# Patient Record
Sex: MALE | Race: WHITE | NOT HISPANIC OR LATINO | URBAN - METROPOLITAN AREA
[De-identification: names, ages, dates, MRNs, and addresses within clinical notes are randomized per-mention and may not be internally consistent; named-entity substitution may affect disease eponyms.]

---

## 2021-06-07 ENCOUNTER — OFFICE VISIT (OUTPATIENT)
Dept: URBAN - METROPOLITAN AREA CLINIC 44 | Facility: CLINIC | Age: 76
End: 2021-06-07

## 2021-08-28 ENCOUNTER — TELEPHONE ENCOUNTER (OUTPATIENT)
Dept: URBAN - METROPOLITAN AREA CLINIC 13 | Facility: CLINIC | Age: 76
End: 2021-08-28

## 2021-08-29 ENCOUNTER — TELEPHONE ENCOUNTER (OUTPATIENT)
Dept: URBAN - METROPOLITAN AREA CLINIC 13 | Facility: CLINIC | Age: 76
End: 2021-08-29

## 2025-01-14 ENCOUNTER — DASHBOARD ENCOUNTERS (OUTPATIENT)
Age: 80
End: 2025-01-14

## 2025-01-15 ENCOUNTER — OFFICE VISIT (OUTPATIENT)
Dept: URBAN - METROPOLITAN AREA CLINIC 48 | Facility: CLINIC | Age: 80
End: 2025-01-15
Payer: MEDICARE

## 2025-01-15 VITALS
BODY MASS INDEX: 22.46 KG/M2 | TEMPERATURE: 97.9 F | DIASTOLIC BLOOD PRESSURE: 90 MMHG | OXYGEN SATURATION: 95 % | SYSTOLIC BLOOD PRESSURE: 149 MMHG | HEART RATE: 77 BPM | WEIGHT: 148.2 LBS | HEIGHT: 68 IN

## 2025-01-15 DIAGNOSIS — R10.13 DYSPEPSIA: ICD-10-CM

## 2025-01-15 DIAGNOSIS — K92.1 MELENA: ICD-10-CM

## 2025-01-15 DIAGNOSIS — Z86.19 HISTORY OF HELICOBACTER PYLORI INFECTION: ICD-10-CM

## 2025-01-15 PROCEDURE — 99204 OFFICE O/P NEW MOD 45 MIN: CPT | Performed by: PHYSICIAN ASSISTANT

## 2025-01-15 RX ORDER — MEMANTINE HYDROCHLORIDE 10 MG/1
1 TABLET TABLET ORAL TWICE A DAY
Refills: 0 | Status: ACTIVE | COMMUNITY

## 2025-01-15 RX ORDER — LISINOPRIL 10 MG/1
1 TABLET TABLET ORAL ONCE A DAY
Refills: 3 | Status: ACTIVE | COMMUNITY

## 2025-01-15 RX ORDER — FAMOTIDINE 40 MG/1
1 TABLET AT BEDTIME TABLET ORAL ONCE A DAY
OUTPATIENT

## 2025-01-15 RX ORDER — PANTOPRAZOLE SODIUM 40 MG/1
1 TABLET TABLET, DELAYED RELEASE ORAL ONCE A DAY
Refills: 0 | Status: ON HOLD | COMMUNITY

## 2025-01-15 RX ORDER — FAMOTIDINE 40 MG/1
1 TABLET AT BEDTIME TABLET ORAL ONCE A DAY
Refills: 1 | Status: ACTIVE | COMMUNITY

## 2025-01-15 NOTE — HPI-TODAY'S VISIT:
80 y/o male presents for hospital follow up from ED visit 12/6/24. He presented with acute onset c/o emesis and dark stool and was advised by his PCP's office to go to the ED. He states emesis was non-bloody and did not appear coffee ground. Labs showed BUN  of 22 with normal Cr, Hgb 17.6, normal WBC, PLT; troponins negative X 2; FOBT was negative. A CT abd/pelvis was unremarkable. He had been taking Pepto Bismol for a few days at time of onset of dark stool, which was felt to be the cause, rather than GI bleeding. He was discharged home on Famotidine 40 mg daily. He has had no further symptoms of N/V or dyspepsia; no further black stool. Prior to ED visit, he has had very occasional dyspepsia for which he uses Peto Bismol PRN. Appetite is good and weight is stable. Bowel are regular.   In 4/2021, he was hospitalized and underwent EGD/Colonoscopy with findings of pyloric channel ulcer with obstruction that could not be traversed with endoscope; chronic gastritis with h. pylori on gastric bx; findings consistent with ischemic colitis as well as a masslike prominence in the transverse colon which was biopsied and also consistent with ischemic colitis and negative for malignancy; diverticulosis and internal hemorrhoids also noted. He had a CTA at that time which was negative for any mesenteric artery occlusion. He denies any further issues with ischemic colitis. Treatment called in for H. pylori per discharge summary (tetracycline, bismuth, twice daily Protonix, and Flagyl for 14 days). Pt cannot recall if he took it or not, and does not recall being tested for eradication.

## 2025-01-15 NOTE — PHYSICAL EXAM CARDIOVASCULAR:
no edema,  no murmurs,  regular rate and rhythm [Please see my note below.] : Please see my note below. [FreeTextEntry3] : Sincerely,\par \par RIYA BRADFORD MD\[facility]\par [facility]\par

## 2025-01-16 ENCOUNTER — OFFICE VISIT (OUTPATIENT)
Dept: URBAN - METROPOLITAN AREA SURGERY CENTER 27 | Facility: SURGERY CENTER | Age: 80
End: 2025-01-16

## 2025-01-27 ENCOUNTER — TELEPHONE ENCOUNTER (OUTPATIENT)
Dept: URBAN - METROPOLITAN AREA CLINIC 48 | Facility: CLINIC | Age: 80
End: 2025-01-27